# Patient Record
(demographics unavailable — no encounter records)

---

## 2024-11-19 NOTE — HEALTH RISK ASSESSMENT
[No] : In the past 12 months have you used drugs other than those required for medical reasons? No [0] : 2) Feeling down, depressed, or hopeless: Not at all (0) [Never] : Never [No falls in past year] : Patient reported no falls in the past year [PHQ-2 Negative - No further assessment needed] : PHQ-2 Negative - No further assessment needed [NO] : No [None] : None [With Family] : lives with family [] :  [Fully functional (bathing, dressing, toileting, transferring, walking, feeding)] : Fully functional (bathing, dressing, toileting, transferring, walking, feeding) [Fully functional (using the telephone, shopping, preparing meals, housekeeping, doing laundry, using] : Fully functional and needs no help or supervision to perform IADLs (using the telephone, shopping, preparing meals, housekeeping, doing laundry, using transportation, managing medications and managing finances) [Smoke Detector] : smoke detector [Carbon Monoxide Detector] : carbon monoxide detector [Seat Belt] :  uses seat belt [Sunscreen] : uses sunscreen [With Patient/Caregiver] : , with patient/caregiver [de-identified] : walking [de-identified] : reg [[id number]] : 0 [EyeExamDate] : 01/2024 [Change in mental status noted] : No change in mental status noted [Reports changes in hearing] : Reports no changes in hearing [PapSmearDate] : 01/2024 [AdvancecareDate] : 11/1//924

## 2024-11-19 NOTE — ASSESSMENT
[FreeTextEntry1] : All preventative measures were reviewed with the patient and the patient is due for and agrees to the following as outlined  in the plan  below.  Lab were reviewed in detail with the patient. tdap and rsv as per OB

## 2025-02-19 NOTE — HISTORY OF PRESENT ILLNESS
[Postpartum Follow Up] : postpartum follow up [Complications:___] : no complications [] : delivered by vaginal delivery [Breastfeeding] : currently nursing [BF with Difficulty] : nursing without difficulty [S/Sx PP Depression] : no signs/symptoms of postpartum depression [Erythema] : not erythematous [Back to Normal] : is back to normal in size [Mild] : mild vaginal bleeding [Normal] : the vagina was normal [Cervix Sample Taken] : cervical sample not taken for a Pap smear [Not Done] : Examination of breasts not done [Doing Well] : is doing well [No Sign of Infection] : is showing no signs of infection [Excellent Pain Control] : has excellent pain control [None] : None [FreeTextEntry8] : Patient doing well had postpartum hemorrhage after delivery patient taking prenatal vitamins [de-identified] : Perineum healing well [de-identified] : CBC today, instructions and precautions reviewed follow-up in 4 to 5 weeks

## 2025-03-24 NOTE — HISTORY OF PRESENT ILLNESS
[Postpartum Follow Up] : postpartum follow up [Complications:___] : no complications [] : delivered by vaginal delivery [Male] : Delivery History: baby boy [Breastfeeding] : currently nursing [BF with Difficulty] : nursing without difficulty [S/Sx PP Depression] : no signs/symptoms of postpartum depression [Erythema] : not erythematous [Back to Normal] : is back to normal in size [Mild] : mild vaginal bleeding [Normal] : the vagina was normal [Cervix Sample Taken] : cervical sample not taken for a Pap smear [Not Done] : Examination of breasts not done [Doing Well] : is doing well [No Sign of Infection] : is showing no signs of infection [Excellent Pain Control] : has excellent pain control [None] : None [de-identified] : Perineum clean dry and intact [de-identified] : Discussed contraception.  Patient to decide.  Literature given.  Instructions and precautions reviewed follow-up in 6 months

## 2025-04-09 NOTE — PHYSICAL EXAM
[Alert] : alert [Oriented x 3] : ~L oriented x 3 [Well Nourished] : well nourished [Full Body Skin Exam Performed] : performed [FreeTextEntry3] : A full skin exam was performed including the scalp, face, neck, chest, abdomen, back, buttocks, upper extremities and lower extremities.  The patient declined examination of the breasts and genitalia.   The exam did show the following benign growths: Paoli pigmented nevi - all homogeneous and regular.  - shave scar of the right medial breast with rim pigment.  Erythematous papule of the right hip.  Few telangiectasias and amorphous center with ELM.  Hands without problem currently.

## 2025-04-09 NOTE — ASSESSMENT
[FreeTextEntry1] : A complete skin examination was performed.  We discussed the importance of photoprotection, including the use of hats, protective clothing and sunscreens with an SPF of at least 30.  Sun avoidance was also discussed.  The ABCDE's of melanoma was discussed with the patient.  Regular skin exams are encouraged.  R/o pigmented nevus vs. spitz nevus, right hip. will call patient with path results when available.  Intertrigo on the fingers vs. contact dermatitis to the rings. Discussed at length with patient. Keep dry for now - dry well after washing hands. Elocon ointment prn. Neut. Norw. Formula vs. CeraVe hand cream - use often. If persists, will consider patch testing to determine if metal allergy or other.  Recurrent nevus, right medial breast Will obtain path results from prior dermatology practice.

## 2025-04-09 NOTE — HISTORY OF PRESENT ILLNESS
[FreeTextEntry1] : Patient presents for skin examination. [de-identified] : notes irritated lesion on the right hip, present for over a year. She had a nevus removed from the right breast numerous years ago, elsewhere. She notes irritation on her ring finger from her rings, over the past 2 years (since birth of her first child).  Improves when she takes the rings off, but recurs with rings ("white gold").

## 2025-06-02 NOTE — CHIEF COMPLAINT
[Urgent Visit] : Urgent Visit [FreeTextEntry1] : Patient is a 34-year-old female presents for urgent same-day visit.  Patient complains of a vaginal discharge over the past several days yellow to clear.  Patient is breast-feeding.  Patient states she moist.  Patient denies any vaginal irritation or itching or foul odor.

## 2025-06-02 NOTE — PHYSICAL EXAM
[Normal] : uterus [No Bleeding] : there was no active vaginal bleeding [Uterine Adnexae] : were not tender and not enlarged [FreeTextEntry4] : Culture done.  Minimal whitish discharge. [FreeTextEntry5] : Culture done